# Patient Record
Sex: FEMALE | Race: WHITE | NOT HISPANIC OR LATINO | Employment: OTHER | URBAN - METROPOLITAN AREA
[De-identification: names, ages, dates, MRNs, and addresses within clinical notes are randomized per-mention and may not be internally consistent; named-entity substitution may affect disease eponyms.]

---

## 2021-04-06 DIAGNOSIS — Z23 ENCOUNTER FOR IMMUNIZATION: ICD-10-CM

## 2022-09-08 PROBLEM — F41.9 ANXIETY: Status: ACTIVE | Noted: 2022-09-08

## 2024-03-12 ENCOUNTER — OFFICE VISIT (OUTPATIENT)
Dept: AUDIOLOGY | Age: 64
End: 2024-03-12
Payer: COMMERCIAL

## 2024-03-12 DIAGNOSIS — R42 DIZZINESS AND GIDDINESS: Primary | ICD-10-CM

## 2024-03-12 PROCEDURE — 92540 BASIC VESTIBULAR EVALUATION: CPT | Performed by: AUDIOLOGIST

## 2024-03-12 PROCEDURE — 92537 CALORIC VSTBLR TEST W/REC: CPT | Performed by: AUDIOLOGIST

## 2024-03-12 PROCEDURE — 92567 TYMPANOMETRY: CPT | Performed by: AUDIOLOGIST

## 2024-03-12 NOTE — PROGRESS NOTES
"Videonystagmography (VNG) Evaluation    Name:  Mariana Amaya  :  1960  Age:  63 y.o.  MRN:  [id number]  Date of Evaluation: 24     HISTORY:     Reason for visit: Dizziness    Mariana Amaya is seen today at the request of Dr. Ansari for an evaluation of balance. Today, Mariana reported that onset of symptoms began in August when she experienced two days of lightheadedness with nausea. Following that, she reports feeling \"off\" for a couple of weeks after. Since that time, the patient has had intermittent periods of lightheadedness that when occurring last for the better part of a day.  The current dizziness perception is described as a(n) lightheaded.  The typical duration of symptoms was noted to last hours before subsiding. Episode frequency occurs on a  random  basis, having occurred a \"handful\" of times since December.  The patient has experienced headaches as well but is not sure if they are related to the dizziness.    EVALUATION:    Tympanometry:   Right: Type A; normal middle ear pressure and static compliance    Left: Type B; no measurable middle ear pressure or static compliance, consistent with middle ear pathology.     Oculomotor battery:    Gaze:          Right: Normal        Left: Normal         Up: Normal        Down: Normal      Tracking: Normal    Saccades: Normal     Optokinetic: Normal    Positioning/Positionals:     Lupe Paez Harper:    Right: Negative      Left: Negative        Positionals:     Sittin degree rightbeating nystagmus    Supine: 2 degrees leftbeating, 3 degrees upbeating, suppressed with fixation    Head Right: 1 degree leftbeating, 1 degree upbeating, suppressed with fixation    Head Left: 1 degree leftbeating, 3 degrees upbeating, suppressed with fixation    Body Right: Normal    Body Left: Normal      Calorics: (Normal response <25% difference)    Bithermal Caloric Irrigation: Normal  ** Calorics should be interpreted with caution due to Type B tympanogram in the left " ear**    IMPRESSIONS:     Normal: No evidence of peripheral or central vestibular pathology indicated by today's findings.      RECOMMENDATIONS:     1) Follow-up with referring provider to review today's results.  2) Continue to monitor dizziness symptoms. If symptoms worsen or fail to improve prior to follow-up with their referring provider, contact your primary care/or referring provider and/or urgent medical attention should be considered.  3) Fall precautions were discussed at length with the patient. Most test effects are expected to subside shortly after testing is completed, it was recommended that they use caution moving around for the remainder of the day.           Brandyn Sheldon., CCC-A  Clinical Audiologist  [facility]  [address] [id number]